# Patient Record
Sex: FEMALE | Race: OTHER | HISPANIC OR LATINO | Employment: FULL TIME | URBAN - METROPOLITAN AREA
[De-identification: names, ages, dates, MRNs, and addresses within clinical notes are randomized per-mention and may not be internally consistent; named-entity substitution may affect disease eponyms.]

---

## 2023-01-03 ENCOUNTER — TELEPHONE (OUTPATIENT)
Dept: GASTROENTEROLOGY | Facility: CLINIC | Age: 58
End: 2023-01-03

## 2023-01-03 NOTE — TELEPHONE ENCOUNTER
Spoke with patient  he states he will talk to her and call back if she wants to schedule colonoscopy. Return number provided.

## 2023-01-05 ENCOUNTER — TELEPHONE (OUTPATIENT)
Dept: GASTROENTEROLOGY | Facility: CLINIC | Age: 58
End: 2023-01-05

## 2023-01-05 NOTE — TELEPHONE ENCOUNTER
Placed a call to patient's  to assist in scheduling screening colonoscopy. Will contact clinic back with dates he can schedule in March.

## 2023-10-17 ENCOUNTER — TELEPHONE (OUTPATIENT)
Dept: GASTROENTEROLOGY | Facility: CLINIC | Age: 58
End: 2023-10-17

## 2023-10-18 DIAGNOSIS — R31.21 ASYMPTOMATIC MICROSCOPIC HEMATURIA: Primary | ICD-10-CM

## 2023-10-25 ENCOUNTER — HOSPITAL ENCOUNTER (OUTPATIENT)
Dept: RADIOLOGY | Facility: HOSPITAL | Age: 58
Discharge: HOME | End: 2023-10-25
Attending: INTERNAL MEDICINE
Payer: COMMERCIAL

## 2023-10-25 DIAGNOSIS — R31.21 ASYMPTOMATIC MICROSCOPIC HEMATURIA: ICD-10-CM

## 2023-10-25 PROCEDURE — 76770 US EXAM ABDO BACK WALL COMP: CPT | Mod: TC,PO

## 2024-07-09 ENCOUNTER — OFFICE VISIT (OUTPATIENT)
Dept: URGENT CARE | Facility: CLINIC | Age: 59
End: 2024-07-09
Payer: COMMERCIAL

## 2024-07-09 VITALS
SYSTOLIC BLOOD PRESSURE: 135 MMHG | TEMPERATURE: 98 F | BODY MASS INDEX: 29.88 KG/M2 | OXYGEN SATURATION: 96 % | RESPIRATION RATE: 12 BRPM | WEIGHT: 175 LBS | HEART RATE: 94 BPM | DIASTOLIC BLOOD PRESSURE: 99 MMHG | HEIGHT: 64 IN

## 2024-07-09 DIAGNOSIS — N76.4 VULVAR ABSCESS: Primary | ICD-10-CM

## 2024-07-09 PROCEDURE — 99204 OFFICE O/P NEW MOD 45 MIN: CPT | Mod: S$GLB,,, | Performed by: NURSE PRACTITIONER

## 2024-07-09 RX ORDER — HYDROCODONE BITARTRATE AND ACETAMINOPHEN 5; 325 MG/1; MG/1
1 TABLET ORAL EVERY 6 HOURS PRN
Qty: 12 TABLET | Refills: 0 | Status: SHIPPED | OUTPATIENT
Start: 2024-07-09

## 2024-07-09 RX ORDER — MUPIROCIN 20 MG/G
OINTMENT TOPICAL 3 TIMES DAILY
Qty: 30 G | Refills: 0 | Status: SHIPPED | OUTPATIENT
Start: 2024-07-09

## 2024-07-09 RX ORDER — CEPHALEXIN 500 MG/1
500 CAPSULE ORAL EVERY 6 HOURS
Qty: 40 CAPSULE | Refills: 0 | Status: SHIPPED | OUTPATIENT
Start: 2024-07-09 | End: 2024-07-19

## 2024-07-09 RX ORDER — LEVOTHYROXINE SODIUM 25 UG/1
25 TABLET ORAL EVERY MORNING
COMMUNITY

## 2024-07-09 NOTE — PATIENT INSTRUCTIONS
Keflex 4 times a day for 10 days    Warm to hot compresses or Sitz baths for 15-20 minutes at least 4 times a day until symptoms have significantly improved.    Gentle massage to facilitate drainage    Return to clinic or seek medical re-evaluation if drainage stops or if symptoms worsen, onset of fever/chills/vomiting/diarrhea, redness extends and does not received after 48-72 hours of antibiotics    Norco for pain.  Do not take Tylenol and Norco together as this can result in an overdose of acetaminophen.  Do not drive or operate heavy machinery while taking this medication for pain

## 2024-07-09 NOTE — PROGRESS NOTES
"Subjective:      Patient ID: Wanda Valerio is a 59 y.o. female.    Vitals:  height is 5' 4" (1.626 m) and weight is 79.4 kg (175 lb). Her oral temperature is 97.7 °F (36.5 °C). Her blood pressure is 135/99 (abnormal) and her pulse is 94. Her respiration is 12 and oxygen saturation is 96%.     Chief Complaint: Abscess    Laceration   The incident occurred 12 to 24 hours ago. Pain location: vagina. The laceration mechanism was a razor. The pain is at a severity of 3/10. The pain is mild. The pain has been Fluctuating since onset. She reports no foreign bodies present.       Constitution: Negative for chills and fever.   Genitourinary:  Positive for genital sore (Left labia times 2-3 days.  Patient states was much worse yesterday but has been draining in his significantly smaller less painful today).   Skin:  Positive for wound, lesion, erythema and abscess (draining abscess left labia). Negative for laceration.      Objective:     Physical Exam   Constitutional: She is oriented to person, place, and time.  Non-toxic appearance. She does not appear ill. No distress.   HENT:   Head: Normocephalic and atraumatic.   Nose: Nose normal.   Mouth/Throat: Mucous membranes are moist.   Eyes: Conjunctivae are normal.   Cardiovascular: Normal rate.   Pulmonary/Chest: Effort normal. No respiratory distress.   Abdominal: Normal appearance.   Genitourinary:          Pelvic exam was performed with patient in the lithotomy position.   There is no rash, tenderness, lesion or injury on the right labia. There is tenderness and lesion on the left labia.         Comments: Red marking denotes excoriated area actively draining thick/yellow purulent material.  Wound culture obtained. Blue marking denotes area of erythema, edema/induration consistent with cellulitis     Musculoskeletal:         General: Swelling and tenderness present.   Neurological: no focal deficit. She is alert and oriented to person, place, and time.   Skin: Skin " is warm, dry and not diaphoretic. Capillary refill takes 2 to 3 seconds. erythema and lesion No bruising   Psychiatric: Her behavior is normal. Mood normal.   Nursing note and vitals reviewed.chaperone present ()         Assessment:     1. Vulvar abscess      Offered I&D, declined for antibiotics and home care. Reinforced return to clinic several times if no improvement or drainage stops.  VU    Wound culture pending.  Plan:       Vulvar abscess  -     cephALEXin (KEFLEX) 500 MG capsule; Take 1 capsule (500 mg total) by mouth every 6 (six) hours. for 10 days  Dispense: 40 capsule; Refill: 0  -     HYDROcodone-acetaminophen (NORCO) 5-325 mg per tablet; Take 1 tablet by mouth every 6 (six) hours as needed for Pain.  Dispense: 12 tablet; Refill: 0  -     mupirocin (BACTROBAN) 2 % ointment; Apply topically 3 (three) times daily.  Dispense: 30 g; Refill: 0  -     CULTURE, AEROBIC  (SPECIFY SOURCE)                Decision for keflex due to elevated bun/creatine ratio.  Need to add or change to doxy if MRSA

## 2024-07-18 DIAGNOSIS — Z22.322 MRSA (METHICILLIN RESISTANT STAPH AUREUS) CULTURE POSITIVE: Primary | ICD-10-CM

## 2024-07-18 DIAGNOSIS — Z16.10: ICD-10-CM

## 2024-07-18 RX ORDER — SULFAMETHOXAZOLE AND TRIMETHOPRIM 800; 160 MG/1; MG/1
1 TABLET ORAL 2 TIMES DAILY
Qty: 14 TABLET | Refills: 0 | Status: SHIPPED | OUTPATIENT
Start: 2024-07-18 | End: 2024-07-25